# Patient Record
Sex: MALE | Race: BLACK OR AFRICAN AMERICAN | Employment: UNEMPLOYED | URBAN - METROPOLITAN AREA
[De-identification: names, ages, dates, MRNs, and addresses within clinical notes are randomized per-mention and may not be internally consistent; named-entity substitution may affect disease eponyms.]

---

## 2021-12-20 ENCOUNTER — HOSPITAL ENCOUNTER (OUTPATIENT)
Dept: PHYSICAL THERAPY | Age: 26
Setting detail: THERAPIES SERIES
Discharge: HOME | End: 2021-12-20
Payer: MEDICAID

## 2021-12-20 PROCEDURE — 97161 PT EVAL LOW COMPLEX 20 MIN: CPT | Performed by: PHYSICAL THERAPIST

## 2021-12-20 ASSESSMENT — PAIN DESCRIPTION - ORIENTATION: ORIENTATION: LEFT

## 2021-12-20 ASSESSMENT — PAIN - FUNCTIONAL ASSESSMENT: PAIN_FUNCTIONAL_ASSESSMENT: PREVENTS OR INTERFERES WITH MANY ACTIVE NOT PASSIVE ACTIVITIES

## 2021-12-20 ASSESSMENT — PAIN DESCRIPTION - PAIN TYPE: TYPE: CHRONIC PAIN

## 2021-12-20 ASSESSMENT — PAIN DESCRIPTION - LOCATION: LOCATION: KNEE

## 2021-12-20 ASSESSMENT — PAIN SCALES - GENERAL: PAINLEVEL_OUTOF10: 4

## 2021-12-20 ASSESSMENT — PAIN DESCRIPTION - FREQUENCY: FREQUENCY: INTERMITTENT

## 2021-12-20 ASSESSMENT — PAIN DESCRIPTION - DESCRIPTORS: DESCRIPTORS: ACHING

## 2021-12-20 NOTE — PROGRESS NOTES
[address] [facility]                Phone: [phone]   Fax: [phone]    Physical Therapy Daily Treatment Note  Date:  2021    Patient Name:  Claudia Garcia    :  1995  MRN: [id number]    Evaluating therapist:  GORGE Wilde                   (21)  Restrictions/Precautions:    Diagnosis:  L knee pain   Treatment Diagnosis:    Insurance/Certification information:  [id number]  Referring Physician:  Jaqueline Frankel. Plan of care signed (Y/N):    Visit# / total visits:    Pain level: 4/10   Time In:  Time Out:    Subjective:      Exercises:  Exercise/Equipment Resistance/Repetitions Other comments   Bike              Total Gym squats with ball            toe raises     step ups             side     TKE with SC             standing hip/knee ext with flexband                                                                       Other Therapeutic Activities:      Home Exercise Program:      Manual Treatments:      Modalities:  IFC/MH to L knee     Timed Code Treatment Minutes: Total Treatment Minutes:      Treatment/Activity Tolerance:  [] Patient tolerated treatment well [] Patient limited by fatique  [] Patient limited by pain  [] Patient limited by other medical complications  [] Other:     Prognosis: [] Good [] Fair  [] Poor    Patient Requires Follow-up: [] Yes  [] No    Plan:   [] Continue per plan of care [] Alter current plan (see comments)  [] Plan of care initiated [] Hold pending MD visit [] Discharge  Plan for Next Session:      See Weekly Progress Note: []  Yes  []  No  Next due:        Electronically signed by:   Sima Rodriguez PT

## 2021-12-20 NOTE — PROGRESS NOTES
Physical Therapy  Initial Assessment  Date: 2021  Patient Name: Jimi Cole  MRN: [id number]  : 1995           Subjective   General  Chart Reviewed: Yes  Referring Practitioner: Hartley Halsted.   Referral Date : 21  Diagnosis: L knee pain  PT Visit Information  Onset Date: 21  PT Insurance Information: [id number]  Total # of Visits Approved: 6  Total # of Visits to Date: 1  Subjective  Subjective: pt presents to therapy with c/o L knee pain for ~ 2 yrs of insidious onset; no PMH for L knee injury/sx per pt; no testing done on L knee at this time; MEDS help a little; tells PT that pain comes/goes; no c/o buckling/clicking or popping in the knee; no c/o N/T in the limb; sleep is fine; sleep is fine; no follow-up scheduled at this time  Pain Screening  Patient Currently in Pain: Yes  Pain Assessment  Pain Assessment: 0-10  Pain Level: 4  Pain Type: Chronic pain  Pain Location: Knee  Pain Orientation: Left  Pain Descriptors: Aching  Pain Frequency: Intermittent  Functional Pain Assessment: Prevents or interferes with many active not passive activities  Vital Signs  Patient Currently in Pain: Yes    Objective     Observation/Palpation  Palpation: discomfort noted across lateral aspect of L knee along course of LCL into femoral condyle    AROM RLE (degrees)  RLE AROM: WNL  AROM LLE (degrees)  LLE AROM : WNL  AROM RUE (degrees)  RUE AROM : WNL  AROM LUE (degrees)  LUE AROM : WNL    Strength Other  Other: grossly 4/5 for all ranges L hip/knee; 5/5 across L ankle for all planes     Additional Measures  Special Tests: L knee:  Lachman's/Apley's/varus-valgus    -/-/-  Other: endurance for all prolonged activities is FAIR+  Sensation  Overall Sensation Status: WNL     Assessment   Conditions Requiring Skilled Therapeutic Intervention  Body structures, Functions, Activity limitations: Decreased strength;Decreased endurance  Assessment: pain noted across L knee with all prolonged activities, 4/10  Prognosis: Fair  Decision Making: Low Complexity  Activity Tolerance  Activity Tolerance: Patient Tolerated treatment well       Plan   Plan  Times per week: pt to be seen 2x/week/3 weeks  Current Treatment Recommendations: ROM,Strengthening,Neuromuscular Re-education,Endurance Training,Modalities,Home Exercise Program    Goals  Time Frame for goals: 3 weeks  goal 1: decrease pain across L knee with all prolonged actvitiies, 0-3/10  goal 2: increase strength across L knee to grossly 5/5 for all ranges improving static/dynamic balance to GOOD+/NORMAL  goal 3: improve endurance for all prolonged activities to GOOD/GOOD+  goal 4: assure I with HEP for home management of condition    Patient goals : to get rid of the pain across his L knee with all prolonged activities          Sima Rodriguez, PT

## 2021-12-28 ENCOUNTER — HOSPITAL ENCOUNTER (OUTPATIENT)
Dept: PHYSICAL THERAPY | Age: 26
Setting detail: THERAPIES SERIES
Discharge: HOME | End: 2021-12-28
Payer: MEDICAID

## 2021-12-28 PROCEDURE — 97110 THERAPEUTIC EXERCISES: CPT

## 2021-12-28 PROCEDURE — 97530 THERAPEUTIC ACTIVITIES: CPT

## 2021-12-28 NOTE — PROGRESS NOTES
[address] [facility]                Phone: [phone]   Fax: [phone]    Physical Therapy Daily Treatment Note  Date:  2021    Patient Name:  Kiera Palomino    :  1995  MRN: [id number]      Evaluating therapist:  Adele Kussmaul, MPT                   (21)  Restrictions/Precautions:    Diagnosis:  L knee pain    Insurance/Certification information:  [id number]  Referring Physician:  Fredis Parmar Plan of care signed (Y/N):    Visit# / total visits:    Pain level: 0-7/10     Time In:   1358  Time Out: 1450    Subjective:   Patient presents for first treatment session following initial evaluation. He reports no pain in L knee prior to session this afternoon. He reports he mostly only has pain when his knee is flexed and crossed over other leg, which causes pain /10. Exercises:  Exercise/Equipment Resistance/Repetitions Comments   Bike   8 min    L2           Total Gym squats BL 3 x10 L10         Step ups fwd 20x 6\" felt mild L knee discomfort                   side 20x 6\"         Standing HS curls 3 x10         Calf raises - wedge 3 x12         Fwd Lunge on L LE 20x         Deep squat BL 2 x10                 TKE with flexband  2 x20    green flexband          SL knee dip with rotation  R 2 x10                                 rotation L attempted; noted pain in L knee         BL squat with rotation 2 x10         Treadmill nt                         Objective:  Ther. exercise/activty as listed per flow sheet above. No modalities. Assessment:  Patient performs all exercises well with good effort and pacing. L knee discomfort noted with SL squat with rotation to L side. No pain reported L knee following session. Home Exercise Program:      Manual Treatments:      Modalities:  NT    Timed Code Treatment Minutes:       Total Treatment Minutes:      Treatment/Activity Tolerance:  [x] Patient tolerated treatment well [] Patient limited by fatigue  [] Patient

## 2021-12-30 ENCOUNTER — HOSPITAL ENCOUNTER (OUTPATIENT)
Dept: PHYSICAL THERAPY | Age: 26
Setting detail: THERAPIES SERIES
Discharge: HOME | End: 2021-12-30
Payer: MEDICAID

## 2021-12-30 PROCEDURE — 97110 THERAPEUTIC EXERCISES: CPT

## 2021-12-30 PROCEDURE — 97530 THERAPEUTIC ACTIVITIES: CPT

## 2021-12-30 NOTE — PROGRESS NOTES
[address] [facility]                Phone: [phone]   Fax: [phone]    Physical Therapy Daily Treatment Note  Date:  2021    Patient Name:  Yoselin Grace    :  1995  MRN: [id number]      Evaluating therapist:  GORGE Partida                   (21)  Restrictions/Precautions:    Diagnosis:  L knee pain    Insurance/Certification information:  [id number]  Referring Physician:  Emmy Hernandez. Plan of care signed (Y/N):    Visit# / total visits:  3/6  Pain level: 0-7/10     Time In:     6209  Time Out:  1442    Subjective:   Patient presents for second of two scheduled treatment sessions this week. He reports no pain in L knee prior to session this afternoon. Pain remains when his knee is flexed and crossed over other leg,  and with certain CKC motions with L knee flexed. Exercises:  Exercise/Equipment Resistance/Repetitions Comments   Bike   8 min    L2           Total Gym squats BL 3 x10 L10         Step ups fwd 2 x15   8\" felt mild L knee discomfort                   side 20x      6\"         Standing HS curls  3 x10 1#         Calf raises - wedge 3 x15         Pyramid Knee extension 3 x10 20#         Rocker board squat 3 x10                 TKE with flexband  nt         Agility ladder 10 min         Treadmill 3  min 3.5 mph  2 min 4.5 mph  1 min 2.5 mph         Pyramid LAQ  L LE 3 x10  15#             Objective:  Ther. exercise/activty as listed per flow sheet above. No modalities. Assessment:  Patient performs all exercises well with good effort and pacing. No pain noted R knee with exercises/activities of session. Home Exercise Program:  nt    Manual Treatments:      Modalities:  NT    Timed Code Treatment Minutes:       Total Treatment Minutes:      Treatment/Activity Tolerance:  [x] Patient tolerated treatment well [] Patient limited by fatigue  [] Patient limited by pain  [] Patient limited by other medical complications  [] Other: Prognosis: [] Good [] Fair  [] Poor    Patient Requires Follow-up: [x] Yes  [] No    Plan:   [x] Continue per plan of care [] Alter current plan (see comments)  [] Plan of care initiated [] Hold pending MD visit [] Discharge    Plan for Next Session:  Continue POC with progressions per patient tolerance.       See Weekly Progress Note: []  Yes  []  No  Next due:          CPT codes 12/30/2021 Units  Minutes   Low Complexity PT evaluation  55662       Moderate Complexity PT evaluation  03311       High Complexity PT evaluation 54862       PT Re-evaluation  56796       Electrical stimulation 37547      Gait training 04906       Manual therapy  62069       Therapeutic activities  61468 2      Therapeutic exercises  86312 1     Neuromuscular reeducation  91023              Electronically signed by:  Juan Pablo Tanner, PTA [id number]

## 2022-01-04 ENCOUNTER — HOSPITAL ENCOUNTER (OUTPATIENT)
Dept: PHYSICAL THERAPY | Age: 27
Setting detail: THERAPIES SERIES
Discharge: HOME | End: 2022-01-04
Payer: COMMERCIAL

## 2022-01-04 PROCEDURE — 97530 THERAPEUTIC ACTIVITIES: CPT

## 2022-01-04 PROCEDURE — 97110 THERAPEUTIC EXERCISES: CPT

## 2022-01-04 NOTE — PROGRESS NOTES
[address] [facility]                Phone: [phone]   Fax: [phone]    Physical Therapy Daily Treatment Note  Date:  2022    Patient Name:  Tim Thao    :  1995  MRN: [id number]      Evaluating therapist:  GORGE Rincon                   (21)  Restrictions/Precautions:    Diagnosis:  L knee pain    Insurance/Certification information:  [id number]  Referring Physician:  Bakari Garcia Plan of care signed (Y/N):    Visit# / total visits:     Pain level: 0-7/10     Time In:      1402  Time Out:   1448     Subjective:   Patient presents for first of two scheduled treatment sessions this week. He reports no pain in L knee today. He states he played basketball yesterday morning and did not have pain during or after. He states he continues to be a little apprehensive with L knee due to previous episodes of pain. Exercises:  Exercise/Equipment Resistance/Repetitions Comments   Bike   8 min    L2           Total Gym squats BL 3 x10 L10         Step ups fwd 2 x15   8\"                     side 2 x15      6\"         Standing HS curls  3 x10 2#         Calf raises - wedge 3 x15         Pyramid Knee extension 3 x10 20#         Rocker board squat 3 x10         Agility ladder 10 min         Treadmill 1.5  min 3.5. 0mph  3 min  4.7 mph  1 min  2.5 mph             Objective:  Ther. exercise/activty as listed per flow sheet above. No modalities. Assessment:  Patient performs all exercises well with good effort and pacing. No pain noted LEFT knee with exercises/activities of session. Home Exercise Program:  continue athletic activities as tolerated. Manual Treatments:      Modalities:  NT    Timed Code Treatment Minutes:       Total Treatment Minutes:      Treatment/Activity Tolerance:  [x] Patient tolerated treatment well [] Patient limited by fatigue  [] Patient limited by pain  [] Patient limited by other medical complications  [] Other:     Prognosis: [] Good [] Fair  [] Poor    Patient Requires Follow-up: [x] Yes  [] No    Plan:   [x] Continue per plan of care [] Alter current plan (see comments)  [] Plan of care initiated [] Hold pending MD visit [] Discharge    Plan for Next Session:  Continue POC with progressions per patient tolerance.       See Weekly Progress Note: []  Yes  []  No  Next due:          CPT codes 1/4/2022 Units  Minutes   Low Complexity PT evaluation  79454       Moderate Complexity PT evaluation  25286       High Complexity PT evaluation 78154       PT Re-evaluation  95967       Electrical stimulation 42353      Gait training 79315       Manual therapy  55560       Therapeutic activities  92682 2      Therapeutic exercises  82676 1     Neuromuscular reeducation  91759              Electronically signed by:  Juan Pablo Toure, PTA [id number]

## 2022-01-06 ENCOUNTER — HOSPITAL ENCOUNTER (OUTPATIENT)
Dept: PHYSICAL THERAPY | Age: 27
Setting detail: THERAPIES SERIES
Discharge: HOME | End: 2022-01-06
Payer: COMMERCIAL

## 2022-01-06 PROCEDURE — 97530 THERAPEUTIC ACTIVITIES: CPT

## 2022-01-06 PROCEDURE — 97110 THERAPEUTIC EXERCISES: CPT

## 2022-01-06 NOTE — PROGRESS NOTES
[address] [facility]                Phone: [phone]   Fax: [phone]    Physical Therapy Daily Treatment Note  Date:  2022    Patient Name:  Gracia Mena    :  1995  MRN: [id number]      Evaluating therapist:  GORGE Rodriguez                   (21)  Restrictions/Precautions:    Diagnosis:  L knee pain    Insurance/Certification information:  [id number]  Referring Physician:  Felix Aguilar Plan of care signed (Y/N):    Visit# / total visits:     Pain level:  0/10     Time In:      1358  Time Out:   1442    Subjective:   Patient presents for second of two scheduled treatment sessions this week. He reports no pain in L knee today. He thinks his knee may be getting better with the strengthening and other exercises. Exercises:  Exercise/Equipment Resistance/Repetitions Comments   Bike   8 min    L2           Total Gym squats BL 3 x10 L10         Step ups fwd 3 x10   12\"                     side 3 x10   8\"         Standing HS curls  3 x10 2#         Calf raises - wedge 3 x15         Pyramid Knee extension 3 x10 15#         Rocker board squat 3 x10         Agility ladder 10 min         Treadmill 3 min  3.2 mph  3 min  4.5 mph  1 min  2.0 mph             Objective:  Ther. exercise/activty as listed per flow sheet above. No modalities. Strength L knee  4+ to 5/5    Assessment:  Patient performs all exercises well with good effort and pacing. Endurance for prolonged activities GOOD/GOOD+  Patient voices good understanding of HEP provided today. Goals:  Time Frame for goals: 3 weeks  goal 1: decrease pain across L knee with all prolonged actvitiies, 0-3/10  goal 2: increase strength across L knee to grossly 5/5 for all ranges improving static/dynamic balance to GOOD+/NORMAL  goal 3: improve endurance for all prolonged activities to GOOD/GOOD+  goal 4: assure I with HEP for home management of condition    Home Exercise Program:  HEP reviewed with copy provided. Manual Treatments:      Modalities:  NT    Timed Code Treatment Minutes: Total Treatment Minutes:      Treatment/Activity Tolerance:  [x] Patient tolerated treatment well [] Patient limited by fatigue  [] Patient limited by pain  [] Patient limited by other medical complications  [] Other:     Prognosis: [] Good [] Fair  [] Poor    Patient Requires Follow-up: [x] Yes  [] No    Plan:   [x] Continue per plan of care [] Alter current plan (see comments)  [] Plan of care initiated [] Hold pending MD visit [] Discharge    Plan for Next Session:  Continue POC with progressions per patient tolerance.       See Weekly Progress Note: []  Yes  []  No  Next due:          CPT codes 1/6/2022 Units  Minutes   Low Complexity PT evaluation  62117       Moderate Complexity PT evaluation  92819       High Complexity PT evaluation 54824       PT Re-evaluation  76734       Electrical stimulation 60567      Gait training 75662       Manual therapy  51687       Therapeutic activities  38701 2      Therapeutic exercises  07960 1     Neuromuscular reeducation  27185              Electronically signed by:  Juan Pablo Gil, PTA [id number]

## 2022-01-11 ENCOUNTER — HOSPITAL ENCOUNTER (OUTPATIENT)
Dept: PHYSICAL THERAPY | Age: 27
Setting detail: THERAPIES SERIES
Discharge: HOME | End: 2022-01-11
Payer: COMMERCIAL

## 2022-01-11 PROCEDURE — 97530 THERAPEUTIC ACTIVITIES: CPT

## 2022-01-11 PROCEDURE — 97110 THERAPEUTIC EXERCISES: CPT

## 2022-01-11 NOTE — PROGRESS NOTES
[address] [facility]                Phone: [phone]   Fax: [phone]    Physical Therapy Daily Treatment Note  Date:  2022    Patient Name:  Kennedy Dockery    :  1995  MRN: [id number]      Evaluating therapist:  GORGE Alexandra                   (21)  Restrictions/Precautions:    Diagnosis:  L knee pain    Insurance/Certification information:  [id number]  Referring Physician:  Gino Sanford. Plan of care signed (Y/N):    Visit# / total visits:     Pain level: 0/10     Time In:      1407  Time Out:    1450    Subjective:   Patient presents for final scheduled treatment session. He reports no pain in L knee today. Exercises:  Exercise/Equipment Resistance/Repetitions Comments   Bike   8 min    L2           Total Gym squats BL 3 x10 L10         Step ups fwd 3 x10   12\"                     side 3 x10   8\"         Standing HS curls  3 x10 2#         Calf raises - wedge 3 x15         Pyramid Knee extension 3 x10 15#         Rocker board squat 3 x10         Agility ladder 10 min         Treadmill 3 min  3.2 mph  3 min  5.0 mph  1 min  2.0 mph             Objective:  Ther. exercise/activty as listed per flow sheet above. No modalities. Strength L knee  5/5    Assessment:  Patient performs all exercises well with good effort and pacing. Endurance for prolonged activities  GOOD+/NORMAL      Goals:  Time Frame for goals: 3 weeks  goal 1: decrease pain across L knee with all prolonged actvitiies, 0-3/10  ACHIEVED  goal 2: increase strength across L knee to grossly 5/5 for all ranges improving static/dynamic balance to GOOD+/NORMAL  ACHIEVED  goal 3: improve endurance for all prolonged activities to GOOD/GOOD+  ACHIEVED  goal 4: assure I with HEP for home management of condition  ACHIEVED    Home Exercise Program:  HEP reviewed with copy provided. Manual Treatments:      Modalities:  NT    Timed Code Treatment Minutes:       Total Treatment Minutes: Treatment/Activity Tolerance:  [x] Patient tolerated treatment well [] Patient limited by fatigue  [] Patient limited by pain  [] Patient limited by other medical complications  [] Other:     Prognosis: [] Good [] Fair  [] Poor    Patient Requires Follow-up: [x] Yes  [] No    Plan:   [x] Continue per plan of care [] Alter current plan (see comments)  [] Plan of care initiated [] Hold pending MD visit [] Discharge    Plan for Next Session:  Continue POC with progressions per patient tolerance.       See Weekly Progress Note: []  Yes  []  No  Next due:          CPT codes 1/11/2022 Units  Minutes   Low Complexity PT evaluation  31838       Moderate Complexity PT evaluation  91953       High Complexity PT evaluation 87533       PT Re-evaluation  62911       Electrical stimulation 12437      Gait training 86297       Manual therapy  70401       Therapeutic activities  41199 2      Therapeutic exercises  28450 1     Neuromuscular reeducation  59922              Electronically signed by:  Juan Pablo Maki, PTA [id number]

## 2022-01-12 NOTE — PROGRESS NOTES
[address] [facility]                Phone: [phone]   Fax: [phone]      Physical Therapy  Treatment Summary       Date:  2022    Patient Name:  Stephen Luz    :  1995  MRN: [id number]    DIAGNOSIS:   L knee pain   REFERRING PHYSICIAN:  Vicky Mantilla.  PHYSICAL THERAPIST:  GORGE Hopper      ATTENDANCE:  Patient has attended 6 of 6 scheduled treatments from 21  to 22. TREATMENTS RECEIVED:  Therapeutic exercise, PROM/AROM, stretching, strengthening, functional activities, balance/proprioceptive activities, HEP, modalities. INITIAL STATUS:  c/o L knee pain for ~ 2 yrs of insidious onset  c/o pain noted across L knee with all prolonged activities, 4/10  Palpation: discomfort noted across lateral aspect of L knee along course of LCL into femoral condyle  AROM L knee WNL  Strength grossly 4/5 for all ranges L hip/knee; 5/5 across L ankle for all planes  Special Tests: L knee:  Lachman's/Apley's/varus-valgus    -/-/-  Endurance for all prolonged activities is [employer]      FINAL STATUS:  No c/o pain noted across L knee with all prolonged activities  Palpation: no discomfort noted across lateral aspect of L knee AROM L knee WNL  Strength grossly 5/5 for all ranges L hip/knee; 5/5 across L ankle for all planes  Endurance for all prolonged activities is GOOD+/NORMAL  Independent with home management    GOALS:  4 out of 4 Long Term Goals were obtained. LONG TERM GOALS OBTAINED/NOT OBTAINED:   goal 1: decrease pain across L knee with all prolonged actvitiies, 0-3/10  ACHIEVED  goal 2: increase strength across L knee to grossly 5/5 for all ranges improving static/dynamic balance to GOOD+/NORMAL  ACHIEVED  goal 3: improve endurance for all prolonged activities to GOOD/GOOD+  ACHIEVED  goal 4: assure I with HEP for home management of condition  ACHIEVED      RECOMMENDATIONS:  Discharge from PT to HEP at this time.         Electronically Signed by: Dragan Cancino ATC, PTA [id number]   1/12/2022      I have read the above summary and agree with all the content. Patient is discharged from PT care at this time.    Minta Burkitt, PT, PT